# Patient Record
Sex: MALE | Race: OTHER | HISPANIC OR LATINO | Employment: UNEMPLOYED | URBAN - METROPOLITAN AREA
[De-identification: names, ages, dates, MRNs, and addresses within clinical notes are randomized per-mention and may not be internally consistent; named-entity substitution may affect disease eponyms.]

---

## 2024-08-08 ENCOUNTER — APPOINTMENT (OUTPATIENT)
Dept: RADIOLOGY | Facility: HOSPITAL | Age: 24
End: 2024-08-08
Payer: COMMERCIAL

## 2024-08-08 ENCOUNTER — APPOINTMENT (OUTPATIENT)
Dept: CARDIOLOGY | Facility: HOSPITAL | Age: 24
End: 2024-08-08
Payer: COMMERCIAL

## 2024-08-08 ENCOUNTER — HOSPITAL ENCOUNTER (EMERGENCY)
Facility: HOSPITAL | Age: 24
Discharge: HOME | End: 2024-08-08
Attending: EMERGENCY MEDICINE
Payer: COMMERCIAL

## 2024-08-08 VITALS
OXYGEN SATURATION: 99 % | DIASTOLIC BLOOD PRESSURE: 86 MMHG | WEIGHT: 160 LBS | RESPIRATION RATE: 18 BRPM | SYSTOLIC BLOOD PRESSURE: 147 MMHG | TEMPERATURE: 99.9 F | BODY MASS INDEX: 23.7 KG/M2 | HEIGHT: 69 IN | HEART RATE: 89 BPM

## 2024-08-08 DIAGNOSIS — F41.9 ANXIETY: Primary | ICD-10-CM

## 2024-08-08 LAB
ALBUMIN SERPL BCP-MCNC: 5.2 G/DL (ref 3.4–5)
ALP SERPL-CCNC: 38 U/L (ref 33–120)
ALT SERPL W P-5'-P-CCNC: 17 U/L (ref 10–52)
ANION GAP SERPL CALC-SCNC: 15 MMOL/L (ref 10–20)
AST SERPL W P-5'-P-CCNC: 36 U/L (ref 9–39)
BASOPHILS # BLD AUTO: 0.03 X10*3/UL (ref 0–0.1)
BASOPHILS NFR BLD AUTO: 0.4 %
BILIRUB SERPL-MCNC: 0.6 MG/DL (ref 0–1.2)
BNP SERPL-MCNC: 6 PG/ML (ref 0–99)
BUN SERPL-MCNC: 16 MG/DL (ref 6–23)
CALCIUM SERPL-MCNC: 10.2 MG/DL (ref 8.6–10.3)
CARDIAC TROPONIN I PNL SERPL HS: <3 NG/L (ref 0–20)
CHLORIDE SERPL-SCNC: 104 MMOL/L (ref 98–107)
CO2 SERPL-SCNC: 24 MMOL/L (ref 21–32)
CREAT SERPL-MCNC: 0.83 MG/DL (ref 0.5–1.3)
D DIMER PPP FEU-MCNC: <215 NG/ML FEU
EGFRCR SERPLBLD CKD-EPI 2021: >90 ML/MIN/1.73M*2
EOSINOPHIL # BLD AUTO: 0.17 X10*3/UL (ref 0–0.7)
EOSINOPHIL NFR BLD AUTO: 2.3 %
ERYTHROCYTE [DISTWIDTH] IN BLOOD BY AUTOMATED COUNT: 12 % (ref 11.5–14.5)
GLUCOSE SERPL-MCNC: 89 MG/DL (ref 74–99)
HCT VFR BLD AUTO: 49.2 % (ref 41–52)
HGB BLD-MCNC: 16.7 G/DL (ref 13.5–17.5)
IMM GRANULOCYTES # BLD AUTO: 0.01 X10*3/UL (ref 0–0.7)
IMM GRANULOCYTES NFR BLD AUTO: 0.1 % (ref 0–0.9)
LYMPHOCYTES # BLD AUTO: 2.94 X10*3/UL (ref 1.2–4.8)
LYMPHOCYTES NFR BLD AUTO: 40.3 %
MAGNESIUM SERPL-MCNC: 2.03 MG/DL (ref 1.6–2.4)
MCH RBC QN AUTO: 31.2 PG (ref 26–34)
MCHC RBC AUTO-ENTMCNC: 33.9 G/DL (ref 32–36)
MCV RBC AUTO: 92 FL (ref 80–100)
MONOCYTES # BLD AUTO: 0.47 X10*3/UL (ref 0.1–1)
MONOCYTES NFR BLD AUTO: 6.4 %
NEUTROPHILS # BLD AUTO: 3.68 X10*3/UL (ref 1.2–7.7)
NEUTROPHILS NFR BLD AUTO: 50.5 %
NRBC BLD-RTO: 0 /100 WBCS (ref 0–0)
PLATELET # BLD AUTO: 245 X10*3/UL (ref 150–450)
POTASSIUM SERPL-SCNC: 4.8 MMOL/L (ref 3.5–5.3)
PROT SERPL-MCNC: 8.6 G/DL (ref 6.4–8.2)
RBC # BLD AUTO: 5.35 X10*6/UL (ref 4.5–5.9)
SODIUM SERPL-SCNC: 138 MMOL/L (ref 136–145)
WBC # BLD AUTO: 7.3 X10*3/UL (ref 4.4–11.3)

## 2024-08-08 PROCEDURE — 85025 COMPLETE CBC W/AUTO DIFF WBC: CPT

## 2024-08-08 PROCEDURE — 84484 ASSAY OF TROPONIN QUANT: CPT

## 2024-08-08 PROCEDURE — 85379 FIBRIN DEGRADATION QUANT: CPT

## 2024-08-08 PROCEDURE — 2500000004 HC RX 250 GENERAL PHARMACY W/ HCPCS (ALT 636 FOR OP/ED)

## 2024-08-08 PROCEDURE — 71045 X-RAY EXAM CHEST 1 VIEW: CPT

## 2024-08-08 PROCEDURE — 36415 COLL VENOUS BLD VENIPUNCTURE: CPT

## 2024-08-08 PROCEDURE — 83735 ASSAY OF MAGNESIUM: CPT

## 2024-08-08 PROCEDURE — 80053 COMPREHEN METABOLIC PANEL: CPT

## 2024-08-08 PROCEDURE — 83880 ASSAY OF NATRIURETIC PEPTIDE: CPT

## 2024-08-08 PROCEDURE — 99283 EMERGENCY DEPT VISIT LOW MDM: CPT | Mod: 25

## 2024-08-08 PROCEDURE — 96360 HYDRATION IV INFUSION INIT: CPT

## 2024-08-08 PROCEDURE — 93005 ELECTROCARDIOGRAM TRACING: CPT

## 2024-08-08 PROCEDURE — 71045 X-RAY EXAM CHEST 1 VIEW: CPT | Performed by: STUDENT IN AN ORGANIZED HEALTH CARE EDUCATION/TRAINING PROGRAM

## 2024-08-08 PROCEDURE — 99285 EMERGENCY DEPT VISIT HI MDM: CPT | Performed by: EMERGENCY MEDICINE

## 2024-08-08 RX ADMIN — SODIUM CHLORIDE, POTASSIUM CHLORIDE, SODIUM LACTATE AND CALCIUM CHLORIDE 1000 ML: 600; 310; 30; 20 INJECTION, SOLUTION INTRAVENOUS at 21:07

## 2024-08-08 ASSESSMENT — HEART SCORE
ECG: NORMAL
TROPONIN: LESS THAN OR EQUAL TO NORMAL LIMIT
HISTORY: SLIGHTLY SUSPICIOUS
RISK FACTORS: NO KNOWN RISK FACTORS
AGE: <45
HEART SCORE: 0

## 2024-08-08 ASSESSMENT — PAIN DESCRIPTION - LOCATION: LOCATION: ABDOMEN

## 2024-08-08 ASSESSMENT — PAIN SCALES - GENERAL
PAINLEVEL_OUTOF10: 0 - NO PAIN
PAINLEVEL_OUTOF10: 3

## 2024-08-08 ASSESSMENT — LIFESTYLE VARIABLES
EVER FELT BAD OR GUILTY ABOUT YOUR DRINKING: NO
HAVE YOU EVER FELT YOU SHOULD CUT DOWN ON YOUR DRINKING: NO
TOTAL SCORE: 0
EVER HAD A DRINK FIRST THING IN THE MORNING TO STEADY YOUR NERVES TO GET RID OF A HANGOVER: NO
HAVE PEOPLE ANNOYED YOU BY CRITICIZING YOUR DRINKING: NO

## 2024-08-08 ASSESSMENT — COLUMBIA-SUICIDE SEVERITY RATING SCALE - C-SSRS
6. HAVE YOU EVER DONE ANYTHING, STARTED TO DO ANYTHING, OR PREPARED TO DO ANYTHING TO END YOUR LIFE?: NO
2. HAVE YOU ACTUALLY HAD ANY THOUGHTS OF KILLING YOURSELF?: NO
1. IN THE PAST MONTH, HAVE YOU WISHED YOU WERE DEAD OR WISHED YOU COULD GO TO SLEEP AND NOT WAKE UP?: NO

## 2024-08-08 ASSESSMENT — PAIN - FUNCTIONAL ASSESSMENT
PAIN_FUNCTIONAL_ASSESSMENT: 0-10
PAIN_FUNCTIONAL_ASSESSMENT: 0-10

## 2024-08-09 LAB
ATRIAL RATE: 98 BPM
P AXIS: 72 DEGREES
P OFFSET: 212 MS
P ONSET: 159 MS
PR INTERVAL: 116 MS
Q ONSET: 217 MS
QRS COUNT: 16 BEATS
QRS DURATION: 98 MS
QT INTERVAL: 342 MS
QTC CALCULATION(BAZETT): 436 MS
QTC FREDERICIA: 402 MS
R AXIS: 69 DEGREES
T AXIS: 33 DEGREES
T OFFSET: 388 MS
VENTRICULAR RATE: 98 BPM

## 2024-08-09 NOTE — ED NOTES
Patient has LEP;  family at bedside to interpret; patients only complaint is feeling anxious; he is slightly tachypneic and tachycardic; patient placed on monitor     Priscila Gutierrez RN  08/08/24 1042

## 2024-08-09 NOTE — ED PROVIDER NOTES
Emergency Medicine Transition of Care Note.    I received Rhys Zhang in signout from Dr. Landaverde.  Please see the previous ED provider note for all HPI, PE and MDM up to the time of signout at 2130. This is in addition to the primary record.    In brief Rhys Zhang is an 23 y.o. male presenting for   Chief Complaint   Patient presents with    Anxiety     Martti used to translation during triage. Pt states he has a history of anxiety, has been thinking about an operation coming up in August for a right inguinal hernia that he has which caused him to feel SOB, blurry vision, numbness/tingling in bilateral hands, and like his heart was racing. Pt states he feels a little better now and believes it is anxiety.      At the time of signout we were awaiting: laboratory workup and likely discharge if everything returns normal.     Medical Decision Making    Please see ED course for additional MDM         Final diagnoses:   None           Procedure  Procedures    Jose Durand, DO

## 2024-08-09 NOTE — ED PROVIDER NOTES
Emergency Department Provider Note        History of Present Illness     History provided by: Patient and Friend  Limitations to History: None  External Records Reviewed with Brief Summary: None    HPI:  Rhys Zhang is a 23 y.o. male with no significant medical history presents with a chief complaint of an anxiety attack.  Patient states he has no medical or surgical history, takes no daily medications, he denies tobacco use alcohol use or illicit drug use.  He states that he does have a history of anxiety in the past and that it feels similar.  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  Patient states that he was having rapid breathing earlier today started feeling numbness in his fingers.  He states after arrival to the emergency department is calm down and no longer breathing rapidly and that his symptoms have resolved.  He denies nausea denies vomiting denies chest pain at this time denies shortness of breath denies pain, denies fever, denies chills, denies sick contacts.    Physical Exam   Triage vitals:  T    HR    BP    RR    O2        General: Awake, alert, in no acute distress  Eyes: Gaze conjugate.  No scleral icterus or injection  HENT: Normo-cephalic, atraumatic. No stridor  CV: Tachycardic rate, regular rhythm. Radial pulses 2+ bilaterally  Resp: Breathing non-labored, speaking in full sentences.  Clear to auscultation bilaterally  GI: Soft, non-distended, non-tender. No rebound or guarding.  : Deferred  MSK/Extremities: No gross bony deformities. Moving all extremities  Skin: Warm. Appropriate color  Neuro: Alert. Oriented. Face symmetric. Speech is fluent.  Gross strength and sensation intact in b/l UE and LEs  Psych: Appropriate mood and affect    Medical Decision Making & ED Course   Medical Decision  M[age] y.o. male who arrives chief complaint of anxiety attack however arrives tachycardic.  Twelve-lead EKG, CBC, CMP, troponin with delta, D-dimer due to tachycardia, chest x-ray, BNP, magnesium all ordered for evaluation of rapid heart rate and an anxious 23-year-old male.  Patient was offered anxiolytic medication at this time however he preferred to defer.  He does have a follow-up with a primary care provider through [facility] that he states he will be able to make it into within the near future.  Additionally a bolus of lactated Ringer's was given to the patient for fluid resuscitation.  The patient is primarily Malay-speaking, Martti  was used as well as the patient's friend who is at bedside.  Patient was signed out to Dr. Durand pending results.  Patient understands plan of care thus far, anticipate the patient will be able to be discharged home with follow-up with his primary care provider and cardiology should all imaging and laboratory work be negative on this visit.  Patient has a heart score of 0  ----  Scoring Tools Utilized:  Heart score, 0    Differential diagnoses considered include but are not limited to: Anxiety, ACS, pulmonary embolism, acute electrolyte dyscrasia, hypomagnesemia, fever, pneumothorax, pneumonia     Social Determinants of Health which Significantly Impact Care: None identified     EKG Independent Interpretation: EKG interpreted by myself. Please see ED Course for full interpretation.    Independent Result Review and Interpretation: Relevant laboratory and radiographic results were reviewed and independently interpreted by myself.  As necessary, they are commented on in the ED Course.    Chronic conditions affecting the patient's care: As documented above in [facility]    The patient was discussed with the following consultants/services: None    Care Considerations: As documented above in [facility]    ED Course:  ED Course as of 24 2145   Thu Aug 08, 2024   2140  Patient's BNP was negative at 6, D-dimer negative at 215 rule out pulmonary embolism rule out congestive heart failure no electrolyte dyscrasia was noted on CMP or magnesium, normal renal and hepatic function.  Patient was euglycemic with a glucose of 89.  Troponin was negative less than 3 rule out ACS.  CBC showed no signs of leukocytosis or acute infection, hemoglobin hematocrit were stable at 16.7 and 49.2 no signs of acute blood loss.  Chest x-ray shows no acute cardiopulmonary process or osseous changes.  Patient was advised that he needs to follow-up with his primary care provider as he may require echocardiogram or Holter monitor or cardiology follow-up.  Patient and his friend understand plan of care, were given strict return precautions.  Patient discharged home [PV]      ED Course User Index  [PV] Odell Landaverde DO         Diagnoses as of 08/08/24 2145   Anxiety     Disposition   Patient was discharged home.  All results were discussed with patient, he was instructed follow-up with his primary care provider, he and his friend at bedside states that they will follow-up with primary care provider.  They were given strict return precautions and understand plan of care.  Patient discharged home.    Procedures   Procedures    Patient seen and discussed with ED attending physician.    Odell Landaverde DO  Emergency Medicine     Odell Landaverde DO  Resident  08/08/24 2128       Odell Landaverde DO  Resident  08/08/24 2128       Odell Landaverde DO  Resident  08/08/24 2145
